# Patient Record
Sex: MALE | Race: WHITE | URBAN - METROPOLITAN AREA
[De-identification: names, ages, dates, MRNs, and addresses within clinical notes are randomized per-mention and may not be internally consistent; named-entity substitution may affect disease eponyms.]

---

## 2022-10-26 ENCOUNTER — OFFICE VISIT (OUTPATIENT)
Dept: URBAN - METROPOLITAN AREA CLINIC 63 | Facility: CLINIC | Age: 87
End: 2022-10-26
Payer: MEDICARE

## 2022-10-26 DIAGNOSIS — Z96.1 PRESENCE OF INTRAOCULAR LENS: ICD-10-CM

## 2022-10-26 DIAGNOSIS — H35.3290 EXUDATIVE AGE-RELATED MACULAR DEGENERATION: ICD-10-CM

## 2022-10-26 DIAGNOSIS — H25.11 AGE-RELATED NUCLEAR CATARACT, RIGHT EYE: Primary | ICD-10-CM

## 2022-10-26 DIAGNOSIS — H35.30 MACULAR DEGENERATION: ICD-10-CM

## 2022-10-26 PROCEDURE — 92133 CPTRZD OPH DX IMG PST SGM ON: CPT | Performed by: OPTOMETRIST

## 2022-10-26 PROCEDURE — 92004 COMPRE OPH EXAM NEW PT 1/>: CPT | Performed by: OPTOMETRIST

## 2022-10-26 ASSESSMENT — INTRAOCULAR PRESSURE
OS: 16
OD: 16

## 2022-10-26 NOTE — IMPRESSION/PLAN
Impression: Age-related nuclear cataract, right eye: H25.11. Plan: Cataracts account for the patient's complaints. Discussed all risks, benefits, procedures and recovery. Patient understands changing glasses will not improve vision. Patient desires to have surgery and referred to Dr. Luzmaria Nguyen for phacoemulsification with intraocular lens.

## 2022-10-26 NOTE — IMPRESSION/PLAN
Impression: Exudative age-related macular degeneration: H35.3290. Plan: Macular degeneration, wet type, subretinal fluid OD. .  Will refer the patient to a retinal specialist for consult.

## 2022-10-26 NOTE — IMPRESSION/PLAN
Impression: Macular degeneration: H35.30 Bilateral. Plan: Macular degeneration, dry type, OU. Order MAC OCT. Will continue to monitor vision and the patient has been instructed to call with any vision changes. Pt to use AREDS 2 vitamin 1 tab PO BID.